# Patient Record
(demographics unavailable — no encounter records)

---

## 2024-10-07 NOTE — PHYSICAL EXAM
[Well Developed] : well developed [Well Nourished] : well nourished [No Acute Distress] : no acute distress [Normal Conjunctiva] : normal conjunctiva [Normal Venous Pressure] : normal venous pressure [No Carotid Bruit] : no carotid bruit [No Gallop] : no gallop [Clear Lung Fields] : clear lung fields [Good Air Entry] : good air entry [No Respiratory Distress] : no respiratory distress  [Soft] : abdomen soft [Non Tender] : non-tender [No Masses/organomegaly] : no masses/organomegaly [Normal Bowel Sounds] : normal bowel sounds [Normal Gait] : normal gait [No Edema] : no edema [No Cyanosis] : no cyanosis [No Clubbing] : no clubbing [No Varicosities] : no varicosities [No Rash] : no rash [No Skin Lesions] : no skin lesions [Moves all extremities] : moves all extremities [No Focal Deficits] : no focal deficits [Normal Speech] : normal speech [Alert and Oriented] : alert and oriented [Normal memory] : normal memory [de-identified] :  Regular rate and rhythm decrease, S1 Normal S2 2 over 6, systolic erection murmur heard left, left upper sternal border non-radiating.

## 2024-10-07 NOTE — DISCUSSION/SUMMARY
[Coronary Artery Disease] : coronary artery disease [Hyperlipidemia] : hyperlipidemia [Hypertension] : hypertension [Stable] : stable [Patient] : the patient [EKG obtained to assist in diagnosis and management of assessed problem(s)] : EKG obtained to assist in diagnosis and management of assessed problem(s) [FreeTextEntry1] : 69-year-old male with known atherosclerotic heart disease, type II diabetic, hypertensive, hyperlipidemic.  - Increase dosage of Losartan - Schedule an echocardiogram to evaluate cardiac function and heart murmur - Regular ophthalmologic examinations recommended due to diabetes - Regular home blood pressure monitoring, document results - Follow up with Dr. Gomez for blood pressure check - Follow up visit in three months with nurse practitioner - On ASA lifelong for ASHD.

## 2024-10-07 NOTE — CARDIOLOGY SUMMARY
[No Ischemia] : no Ischemia [No Exercise Ind Arr] : no exercise induced arrhythmias [No Symptoms] : no Symptoms [___] : [unfilled] [LVEF ___%] : LVEF [unfilled]% [de-identified] : 10/7/24, Sinus Rhythm, -RSR(V1) -nondiagnostic. -Inferior ST-elevation -repolarization variant. -Nonspecific T-abnormality. 5/16/24, Sinus Rhythm, -RSR(V1) -nondiagnostic. 10/10/23, Sinus Rhythm -Left axis -anterior fascicular block.-Nonspecific T-abnormality.  ABNORMAL 5/17/23, Sinus  Rhythm  - frequent ectopic ventricular beat s  -RSR(V1) -nondiagnostic.   -  Nonspecific T-abnormality.  3/30/22, Sinus  Rhythm  -Prominent R(V1) and left axis -nonspecific  -Seen with pulmonary disease -possible anterior fascicular block.   -  Nonspecific T-abnormality.  [de-identified] : 4/2/19, 55-60%, trace MR and TR [de-identified] : 6/4/19, discrete 60% stenosis of the first diagonal, luminal irregularities throughout the rest of his coronary vascular tree.  EF was 55%.

## 2024-10-07 NOTE — HISTORY OF PRESENT ILLNESS
[FreeTextEntry1] : 69 year-old male with known ASHD, s/p MI in 2007 , s/p PTCA (included 2 drug-eluting stents to the RCA and OM1) Catheterization done in 2019 (for reasons that remain unclear), showed patent stents.   Known history of type 2 diabetes, hypertension and hyperlipidemia.  10/7/24 - - Yumiko Faulkner reports no chest pain or shortness of breath - Daily walking for 1.5 hours - Weight stable - Last blood tests done two months ago, results were normal - Seeing a lung doctor, condition stable - Reports taking blood pressure readings irregularly, once a week

## 2025-01-07 NOTE — DISCUSSION/SUMMARY
[Coronary Artery Disease] : coronary artery disease [Hyperlipidemia] : hyperlipidemia [Hypertension] : hypertension [Stable] : stable [Patient] : the patient [EKG obtained to assist in diagnosis and management of assessed problem(s)] : EKG obtained to assist in diagnosis and management of assessed problem(s) [___ Week(s)] : in [unfilled] week(s)

## 2025-01-08 NOTE — CARDIOLOGY SUMMARY
[de-identified] : 01/07/2025 - Sinus Rhythm -Nonspecific T-abnormality 10/7/24, Sinus Rhythm, -RSR(V1) -nondiagnostic. -Inferior ST-elevation -repolarization variant. -Nonspecific T-abnormality. 5/16/24, Sinus Rhythm, -RSR(V1) -nondiagnostic. 10/10/23, Sinus Rhythm -Left axis -anterior fascicular block.-Nonspecific T-abnormality.  ABNORMAL 5/17/23, Sinus  Rhythm  - frequent ectopic ventricular beat s  -RSR(V1) -nondiagnostic.   -  Nonspecific T-abnormality.  3/30/22, Sinus  Rhythm  -Prominent R(V1) and left axis -nonspecific  -Seen with pulmonary disease -possible anterior fascicular block.   -  Nonspecific T-abnormality.  [de-identified] : 10/30/2024  CONCLUSIONS:  1. Left ventricular cavity is normal in size. Left ventricular wall thickness is normal. Left ventricular systolic function is normal with an ejection fraction of 63 % by Briscoe's method of disks. There are no regional wall motion abnormalities seen. 2. Normal left ventricular diastolic function, with normal left ventricular filling pressure. 3. Normal right ventricular cavity size, with normal wall thickness, and normal right ventricular systolic function. 4. Trace mitral regurgitation. 5. Trace aortic regurgitation. [de-identified] : 6/4/19, discrete 60% stenosis of the first diagonal, luminal irregularities throughout the rest of his coronary vascular tree.  EF was 55%.

## 2025-01-08 NOTE — HISTORY OF PRESENT ILLNESS
[FreeTextEntry1] : 69 year-old male with known ASHD, s/p MI in 2007 , s/p PTCA (included 2 drug-eluting stents to the RCA and OM1) Catheterization done in 2019 (for reasons that remain unclear), showed patent stents.   Known history of type 2 diabetes, hypertension and hyperlipidemia.  10/7/24 - - Yumiko Faulkner reports no chest pain or shortness of breath - Daily walking for 1.5 hours - Weight stable - Last blood tests done two months ago, results were normal - Seeing a lung doctor, condition stable - Reports taking blood pressure readings irregularly, once a week  01/07/2025 No Interval symptoms  Blood Pressure log uploaded, continue to show elevated blood pressure despite increased Losartan Changed medication to hyzaar Weight stable and activity remains the same

## 2025-01-08 NOTE — PHYSICAL EXAM
[Well Developed] : well developed [Well Nourished] : well nourished [No Acute Distress] : no acute distress [Normal Conjunctiva] : normal conjunctiva [Normal Venous Pressure] : normal venous pressure [No Carotid Bruit] : no carotid bruit [No Gallop] : no gallop [Clear Lung Fields] : clear lung fields [Good Air Entry] : good air entry [No Respiratory Distress] : no respiratory distress  [Normal Gait] : normal gait [No Edema] : no edema [No Cyanosis] : no cyanosis [No Clubbing] : no clubbing [No Varicosities] : no varicosities [Moves all extremities] : moves all extremities [No Focal Deficits] : no focal deficits [Normal Speech] : normal speech [Alert and Oriented] : alert and oriented [Normal memory] : normal memory [de-identified] :  Regular rate and rhythm decrease, S1 Normal S2 2 over 6, systolic erection murmur heard left, left upper sternal border non-radiating.

## 2025-01-08 NOTE — CARDIOLOGY SUMMARY
[de-identified] : 01/07/2025 - Sinus Rhythm -Nonspecific T-abnormality 10/7/24, Sinus Rhythm, -RSR(V1) -nondiagnostic. -Inferior ST-elevation -repolarization variant. -Nonspecific T-abnormality. 5/16/24, Sinus Rhythm, -RSR(V1) -nondiagnostic. 10/10/23, Sinus Rhythm -Left axis -anterior fascicular block.-Nonspecific T-abnormality.  ABNORMAL 5/17/23, Sinus  Rhythm  - frequent ectopic ventricular beat s  -RSR(V1) -nondiagnostic.   -  Nonspecific T-abnormality.  3/30/22, Sinus  Rhythm  -Prominent R(V1) and left axis -nonspecific  -Seen with pulmonary disease -possible anterior fascicular block.   -  Nonspecific T-abnormality.  [de-identified] : 10/30/2024  CONCLUSIONS:  1. Left ventricular cavity is normal in size. Left ventricular wall thickness is normal. Left ventricular systolic function is normal with an ejection fraction of 63 % by Briscoe's method of disks. There are no regional wall motion abnormalities seen. 2. Normal left ventricular diastolic function, with normal left ventricular filling pressure. 3. Normal right ventricular cavity size, with normal wall thickness, and normal right ventricular systolic function. 4. Trace mitral regurgitation. 5. Trace aortic regurgitation. [de-identified] : 6/4/19, discrete 60% stenosis of the first diagonal, luminal irregularities throughout the rest of his coronary vascular tree.  EF was 55%.

## 2025-01-08 NOTE — PHYSICAL EXAM
[Well Developed] : well developed [Well Nourished] : well nourished [No Acute Distress] : no acute distress [Normal Conjunctiva] : normal conjunctiva [Normal Venous Pressure] : normal venous pressure [No Carotid Bruit] : no carotid bruit [No Gallop] : no gallop [Clear Lung Fields] : clear lung fields [Good Air Entry] : good air entry [No Respiratory Distress] : no respiratory distress  [Normal Gait] : normal gait [No Edema] : no edema [No Cyanosis] : no cyanosis [No Clubbing] : no clubbing [No Varicosities] : no varicosities [Moves all extremities] : moves all extremities [No Focal Deficits] : no focal deficits [Normal Speech] : normal speech [Alert and Oriented] : alert and oriented [Normal memory] : normal memory [de-identified] :  Regular rate and rhythm decrease, S1 Normal S2 2 over 6, systolic erection murmur heard left, left upper sternal border non-radiating.

## 2025-01-21 NOTE — CARDIOLOGY SUMMARY
[de-identified] : 01/07/2025 - Sinus Rhythm -Nonspecific T-abnormality 10/7/24, Sinus Rhythm, -RSR(V1) -nondiagnostic. -Inferior ST-elevation -repolarization variant. -Nonspecific T-abnormality. 5/16/24, Sinus Rhythm, -RSR(V1) -nondiagnostic. 10/10/23, Sinus Rhythm -Left axis -anterior fascicular block.-Nonspecific T-abnormality.  ABNORMAL 5/17/23, Sinus  Rhythm  - frequent ectopic ventricular beat s  -RSR(V1) -nondiagnostic.   -  Nonspecific T-abnormality.  3/30/22, Sinus  Rhythm  -Prominent R(V1) and left axis -nonspecific  -Seen with pulmonary disease -possible anterior fascicular block.   -  Nonspecific T-abnormality.  [de-identified] : 10/30/2024  CONCLUSIONS:  1. Left ventricular cavity is normal in size. Left ventricular wall thickness is normal. Left ventricular systolic function is normal with an ejection fraction of 63 % by Briscoe's method of disks. There are no regional wall motion abnormalities seen. 2. Normal left ventricular diastolic function, with normal left ventricular filling pressure. 3. Normal right ventricular cavity size, with normal wall thickness, and normal right ventricular systolic function. 4. Trace mitral regurgitation. 5. Trace aortic regurgitation. [de-identified] : 6/4/19, discrete 60% stenosis of the first diagonal, luminal irregularities throughout the rest of his coronary vascular tree.  EF was 55%.

## 2025-01-21 NOTE — PHYSICAL EXAM
[Well Developed] : well developed [Well Nourished] : well nourished [No Acute Distress] : no acute distress [No Gallop] : no gallop [Clear Lung Fields] : clear lung fields [Good Air Entry] : good air entry [No Respiratory Distress] : no respiratory distress  [Normal Gait] : normal gait [No Edema] : no edema [No Cyanosis] : no cyanosis [No Clubbing] : no clubbing [No Varicosities] : no varicosities [Moves all extremities] : moves all extremities [No Focal Deficits] : no focal deficits [Normal Speech] : normal speech [Alert and Oriented] : alert and oriented [Normal memory] : normal memory [de-identified] :  Regular rate and rhythm decrease, S1 Normal S2 2 over 6, systolic erection murmur heard left, left upper sternal border non-radiating.

## 2025-01-21 NOTE — CARDIOLOGY SUMMARY
[de-identified] : 01/07/2025 - Sinus Rhythm -Nonspecific T-abnormality 10/7/24, Sinus Rhythm, -RSR(V1) -nondiagnostic. -Inferior ST-elevation -repolarization variant. -Nonspecific T-abnormality. 5/16/24, Sinus Rhythm, -RSR(V1) -nondiagnostic. 10/10/23, Sinus Rhythm -Left axis -anterior fascicular block.-Nonspecific T-abnormality.  ABNORMAL 5/17/23, Sinus  Rhythm  - frequent ectopic ventricular beat s  -RSR(V1) -nondiagnostic.   -  Nonspecific T-abnormality.  3/30/22, Sinus  Rhythm  -Prominent R(V1) and left axis -nonspecific  -Seen with pulmonary disease -possible anterior fascicular block.   -  Nonspecific T-abnormality.  [de-identified] : 10/30/2024  CONCLUSIONS:  1. Left ventricular cavity is normal in size. Left ventricular wall thickness is normal. Left ventricular systolic function is normal with an ejection fraction of 63 % by Briscoe's method of disks. There are no regional wall motion abnormalities seen. 2. Normal left ventricular diastolic function, with normal left ventricular filling pressure. 3. Normal right ventricular cavity size, with normal wall thickness, and normal right ventricular systolic function. 4. Trace mitral regurgitation. 5. Trace aortic regurgitation. [de-identified] : 6/4/19, discrete 60% stenosis of the first diagonal, luminal irregularities throughout the rest of his coronary vascular tree.  EF was 55%.

## 2025-01-21 NOTE — PHYSICAL EXAM
[Well Developed] : well developed [Well Nourished] : well nourished [No Acute Distress] : no acute distress [No Gallop] : no gallop [Clear Lung Fields] : clear lung fields [Good Air Entry] : good air entry [No Respiratory Distress] : no respiratory distress  [Normal Gait] : normal gait [No Edema] : no edema [No Cyanosis] : no cyanosis [No Clubbing] : no clubbing [No Varicosities] : no varicosities [Moves all extremities] : moves all extremities [No Focal Deficits] : no focal deficits [Normal Speech] : normal speech [Alert and Oriented] : alert and oriented [Normal memory] : normal memory [de-identified] :  Regular rate and rhythm decrease, S1 Normal S2 2 over 6, systolic erection murmur heard left, left upper sternal border non-radiating.

## 2025-04-28 NOTE — HISTORY OF PRESENT ILLNESS
[FreeTextEntry1] : 69 year-old male with known ASHD, s/p MI in 2007 , s/p PTCA (included 2 drug-eluting stents to the RCA and OM1) Catheterization done in 2019 (for reasons that remain unclear), showed patent stents.   Known history of type 2 diabetes, hypertension and hyperlipidemia.  10/7/24 - - Yumiko Faulkner reports no chest pain or shortness of breath - Daily walking for 1.5 hours - Weight stable - Last blood tests done two months ago, results were normal - Seeing a lung doctor, condition stable - Reports taking blood pressure readings irregularly, once a week  01/07/2025 No Interval symptoms  Blood Pressure log uploaded, continue to show elevated blood pressure despite increased Losartan Changed medication to hyzaar Weight stable and activity remains the same  04/22/2025 No Interval symptoms  Tolerating Hyzaar Weight stable and activity remains the same

## 2025-04-28 NOTE — CARDIOLOGY SUMMARY
[de-identified] : 01/07/2025 - Sinus Rhythm -Nonspecific T-abnormality 10/7/24, Sinus Rhythm, -RSR(V1) -nondiagnostic. -Inferior ST-elevation -repolarization variant. -Nonspecific T-abnormality. 5/16/24, Sinus Rhythm, -RSR(V1) -nondiagnostic. 10/10/23, Sinus Rhythm -Left axis -anterior fascicular block.-Nonspecific T-abnormality.  ABNORMAL 5/17/23, Sinus  Rhythm  - frequent ectopic ventricular beat s  -RSR(V1) -nondiagnostic.   -  Nonspecific T-abnormality.  3/30/22, Sinus  Rhythm  -Prominent R(V1) and left axis -nonspecific  -Seen with pulmonary disease -possible anterior fascicular block.   -  Nonspecific T-abnormality.  [de-identified] : 10/30/2024  CONCLUSIONS:  1. Left ventricular cavity is normal in size. Left ventricular wall thickness is normal. Left ventricular systolic function is normal with an ejection fraction of 63 % by Briscoe's method of disks. There are no regional wall motion abnormalities seen. 2. Normal left ventricular diastolic function, with normal left ventricular filling pressure. 3. Normal right ventricular cavity size, with normal wall thickness, and normal right ventricular systolic function. 4. Trace mitral regurgitation. 5. Trace aortic regurgitation. [de-identified] : 6/4/19, discrete 60% stenosis of the first diagonal, luminal irregularities throughout the rest of his coronary vascular tree.  EF was 55%.

## 2025-04-28 NOTE — PHYSICAL EXAM
[Well Developed] : well developed [Well Nourished] : well nourished [No Acute Distress] : no acute distress [No Gallop] : no gallop [Clear Lung Fields] : clear lung fields [Good Air Entry] : good air entry [No Respiratory Distress] : no respiratory distress  [Normal Gait] : normal gait [No Edema] : no edema [No Cyanosis] : no cyanosis [No Clubbing] : no clubbing [No Varicosities] : no varicosities [Moves all extremities] : moves all extremities [No Focal Deficits] : no focal deficits [Normal Speech] : normal speech [Alert and Oriented] : alert and oriented [Normal memory] : normal memory [de-identified] :  Regular rate and rhythm decrease, S1 Normal S2 2 over 6, systolic erection murmur heard left, left upper sternal border non-radiating.

## 2025-04-28 NOTE — PHYSICAL EXAM
[Well Developed] : well developed [Well Nourished] : well nourished [No Acute Distress] : no acute distress [No Gallop] : no gallop [Clear Lung Fields] : clear lung fields [Good Air Entry] : good air entry [No Respiratory Distress] : no respiratory distress  [Normal Gait] : normal gait [No Edema] : no edema [No Cyanosis] : no cyanosis [No Clubbing] : no clubbing [No Varicosities] : no varicosities [Moves all extremities] : moves all extremities [No Focal Deficits] : no focal deficits [Normal Speech] : normal speech [Alert and Oriented] : alert and oriented [Normal memory] : normal memory [de-identified] :  Regular rate and rhythm decrease, S1 Normal S2 2 over 6, systolic erection murmur heard left, left upper sternal border non-radiating.

## 2025-04-28 NOTE — CARDIOLOGY SUMMARY
[de-identified] : 01/07/2025 - Sinus Rhythm -Nonspecific T-abnormality 10/7/24, Sinus Rhythm, -RSR(V1) -nondiagnostic. -Inferior ST-elevation -repolarization variant. -Nonspecific T-abnormality. 5/16/24, Sinus Rhythm, -RSR(V1) -nondiagnostic. 10/10/23, Sinus Rhythm -Left axis -anterior fascicular block.-Nonspecific T-abnormality.  ABNORMAL 5/17/23, Sinus  Rhythm  - frequent ectopic ventricular beat s  -RSR(V1) -nondiagnostic.   -  Nonspecific T-abnormality.  3/30/22, Sinus  Rhythm  -Prominent R(V1) and left axis -nonspecific  -Seen with pulmonary disease -possible anterior fascicular block.   -  Nonspecific T-abnormality.  [de-identified] : 10/30/2024  CONCLUSIONS:  1. Left ventricular cavity is normal in size. Left ventricular wall thickness is normal. Left ventricular systolic function is normal with an ejection fraction of 63 % by Briscoe's method of disks. There are no regional wall motion abnormalities seen. 2. Normal left ventricular diastolic function, with normal left ventricular filling pressure. 3. Normal right ventricular cavity size, with normal wall thickness, and normal right ventricular systolic function. 4. Trace mitral regurgitation. 5. Trace aortic regurgitation. [de-identified] : 6/4/19, discrete 60% stenosis of the first diagonal, luminal irregularities throughout the rest of his coronary vascular tree.  EF was 55%.